# Patient Record
Sex: MALE | Race: WHITE | URBAN - METROPOLITAN AREA
[De-identification: names, ages, dates, MRNs, and addresses within clinical notes are randomized per-mention and may not be internally consistent; named-entity substitution may affect disease eponyms.]

---

## 2017-10-06 ENCOUNTER — FOLLOW-UP (OUTPATIENT)
Dept: URBAN - METROPOLITAN AREA CLINIC 14 | Facility: CLINIC | Age: 32
Setting detail: DERMATOLOGY
End: 2017-10-06

## 2017-10-06 DIAGNOSIS — C44.311 BASAL CELL CARCINOMA OF SKIN OF NOSE: ICD-10-CM

## 2017-10-06 PROCEDURE — 99214 OFFICE O/P EST MOD 30 MIN: CPT

## 2017-11-06 ENCOUNTER — RX ONLY (RX ONLY)
Age: 32
End: 2017-11-06

## 2017-11-06 RX ORDER — GUSELKUMAB 100 MG/ML
1 ML INJECTION SUBCUTANEOUS ADD'L SIG
Qty: 1 | Refills: 5 | Status: DISCONTINUED
Start: 2017-11-06 | End: 2018-03-27

## 2018-03-27 ENCOUNTER — RX ONLY (RX ONLY)
Age: 33
End: 2018-03-27

## 2018-03-27 RX ORDER — GUSELKUMAB 100 MG/ML
1 ML INJECTION SUBCUTANEOUS ADD'L SIG
Qty: 1 | Refills: 5 | Status: DISCONTINUED
Start: 2018-03-27 | End: 2018-11-19

## 2018-11-19 ENCOUNTER — FOLLOW-UP (OUTPATIENT)
Dept: URBAN - METROPOLITAN AREA CLINIC 14 | Facility: CLINIC | Age: 33
Setting detail: DERMATOLOGY
End: 2018-11-19

## 2018-11-19 DIAGNOSIS — L73.8 OTHER SPECIFIED FOLLICULAR DISORDERS: ICD-10-CM

## 2018-11-19 PROCEDURE — 99212 OFFICE O/P EST SF 10 MIN: CPT

## 2018-11-19 RX ORDER — GUSELKUMAB 100 MG/ML
1 ML INJECTION SUBCUTANEOUS ADD'L SIG
Qty: 1 | Refills: 5 | Status: DISCONTINUED
Start: 2018-11-19 | End: 2018-11-20

## 2018-11-20 ENCOUNTER — RX ONLY (RX ONLY)
Age: 33
End: 2018-11-20

## 2018-11-20 RX ORDER — GUSELKUMAB 100 MG/ML
1 ML INJECTION SUBCUTANEOUS ADD'L SIG
Qty: 1 | Refills: 5 | Status: DISCONTINUED
Start: 2018-11-20 | End: 2019-11-06

## 2019-11-06 ENCOUNTER — RX ONLY (RX ONLY)
Age: 34
End: 2019-11-06

## 2019-11-06 RX ORDER — GUSELKUMAB 100 MG/ML
1 ML INJECTION SUBCUTANEOUS ADD'L SIG
Qty: 1 | Refills: 5
Start: 2019-11-06

## 2019-11-11 ENCOUNTER — FOLLOW-UP (OUTPATIENT)
Dept: URBAN - METROPOLITAN AREA CLINIC 14 | Facility: CLINIC | Age: 34
Setting detail: DERMATOLOGY
End: 2019-11-11

## 2019-11-11 PROCEDURE — 99213 OFFICE O/P EST LOW 20 MIN: CPT

## 2019-11-11 RX ORDER — GUSELKUMAB 100 MG/ML
1 ADD'L SIG INJECTION SUBCUTANEOUS AS DIRECTED
Qty: 1 | Refills: 6
Start: 2019-11-11

## 2020-03-26 ENCOUNTER — RX ONLY (RX ONLY)
Age: 35
End: 2020-03-26

## 2020-03-26 RX ORDER — EMOLLIENT COMBINATION NO.43
1 APPLICATION CREAM (GRAM) TOPICAL DAILY
Qty: 30 | Refills: 5
Start: 2020-03-26

## 2020-03-26 RX ORDER — EMOLLIENT COMBINATION NO.43
1 APPLICATION CREAM (GRAM) TOPICAL DAILY
Qty: 30 | Refills: 5 | Status: DISCONTINUED
Start: 2020-03-26 | End: 2020-03-26

## 2020-12-21 ENCOUNTER — TELEHEALTH (OUTPATIENT)
Dept: URBAN - METROPOLITAN AREA CLINIC 14 | Facility: CLINIC | Age: 35
Setting detail: DERMATOLOGY
End: 2020-12-21

## 2020-12-21 DIAGNOSIS — D48.5 NEOPLASM OF UNCERTAIN BEHAVIOR OF SKIN: ICD-10-CM

## 2020-12-21 PROCEDURE — 99212 OFFICE O/P EST SF 10 MIN: CPT

## 2020-12-21 RX ORDER — GUSELKUMAB 100 MG/ML
1 PEN INJECTOR INJECTION SUBCUTANEOUS
Qty: 1 | Refills: 6
Start: 2020-12-21

## 2020-12-28 ENCOUNTER — RX ONLY (RX ONLY)
Age: 35
End: 2020-12-28

## 2020-12-28 RX ORDER — GUSELKUMAB 100 MG/ML
1 PEN INJECTOR INJECTION SUBCUTANEOUS
Qty: 1 | Refills: 6
Start: 2020-12-28

## 2021-03-26 ENCOUNTER — RX ONLY (RX ONLY)
Age: 36
End: 2021-03-26

## 2021-03-26 RX ORDER — SILVER SULFADIAZINE 10 MG/G
1 A SMALL AMOUNT CREAM TOPICAL TWICE A DAY
Qty: 50 | Refills: 1
Start: 2021-03-26

## 2021-09-22 ENCOUNTER — RX ONLY (RX ONLY)
Age: 36
End: 2021-09-22

## 2021-09-22 RX ORDER — EMOLLIENT COMBINATION NO.43
A SMALL AMOUNT CREAM (GRAM) TOPICAL ONCE A DAY
Qty: 30 | Refills: 2
Start: 2021-09-22

## 2022-02-18 ENCOUNTER — FOLLOW-UP (OUTPATIENT)
Dept: URBAN - METROPOLITAN AREA CLINIC 14 | Facility: CLINIC | Age: 37
Setting detail: DERMATOLOGY
End: 2022-02-18

## 2022-02-18 DIAGNOSIS — D03.72 MELANOMA IN SITU OF LEFT LOWER LIMB, INCLUDING HIP: ICD-10-CM

## 2022-02-18 PROCEDURE — 99213 OFFICE O/P EST LOW 20 MIN: CPT

## 2022-02-18 RX ORDER — GUSELKUMAB 100 MG/ML
1 PEN INJECTOR INJECTION SUBCUTANEOUS
Qty: 1 | Refills: 6
Start: 2022-02-18

## 2022-02-21 ENCOUNTER — RX ONLY (RX ONLY)
Age: 37
End: 2022-02-21

## 2022-02-21 RX ORDER — GUSELKUMAB 100 MG/ML
1 PEN INJECTOR INJECTION SUBCUTANEOUS
Qty: 1 | Refills: 6
Start: 2022-02-21

## 2022-03-14 ENCOUNTER — RX ONLY (RX ONLY)
Age: 37
End: 2022-03-14

## 2022-03-14 RX ORDER — EMOLLIENT COMBINATION NO.43
A SMALL AMOUNT CREAM (GRAM) TOPICAL ONCE A DAY
Qty: 30 | Refills: 2
Start: 2022-03-14

## 2022-08-17 ENCOUNTER — RX ONLY (RX ONLY)
Age: 37
End: 2022-08-17

## 2022-08-17 RX ORDER — EMOLLIENT COMBINATION NO.43
A SMALL AMOUNT CREAM (GRAM) TOPICAL ONCE A DAY
Qty: 30 | Refills: 3
Start: 2022-08-17

## 2022-09-20 ENCOUNTER — RX ONLY (RX ONLY)
Age: 37
End: 2022-09-20

## 2022-09-20 RX ORDER — FLUOCINOLONE ACETONIDE 0.11 MG/ML
OIL AURICULAR (OTIC)
Qty: 20 | Refills: 1 | Status: ERX | COMMUNITY
Start: 2022-09-20

## 2023-04-04 ENCOUNTER — RX ONLY (RX ONLY)
Age: 38
End: 2023-04-04

## 2023-04-04 RX ORDER — GUSELKUMAB 100 MG/ML
100MG INJECTION SUBCUTANEOUS
Qty: 1 | Refills: 0 | Status: ERX | COMMUNITY
Start: 2023-04-04

## 2023-04-21 ENCOUNTER — APPOINTMENT (OUTPATIENT)
Dept: URBAN - METROPOLITAN AREA CLINIC 210 | Age: 38
Setting detail: DERMATOLOGY
End: 2023-04-22

## 2023-04-21 DIAGNOSIS — L81.4 OTHER MELANIN HYPERPIGMENTATION: ICD-10-CM

## 2023-04-21 DIAGNOSIS — L40.0 PSORIASIS VULGARIS: ICD-10-CM

## 2023-04-21 PROCEDURE — OTHER COUNSELING: OTHER

## 2023-04-21 PROCEDURE — 99213 OFFICE O/P EST LOW 20 MIN: CPT

## 2023-04-21 PROCEDURE — OTHER TREMFYA MONITORING: OTHER

## 2023-04-21 PROCEDURE — OTHER MIPS QUALITY: OTHER

## 2023-04-21 ASSESSMENT — BSA PSORIASIS: % BODY COVERED IN PSORIASIS: 1

## 2023-04-21 ASSESSMENT — ITCH NUMERIC RATING SCALE: HOW SEVERE IS YOUR ITCHING?: 0

## 2023-04-21 ASSESSMENT — LOCATION SIMPLE DESCRIPTION DERM
LOCATION SIMPLE: RIGHT FOREARM
LOCATION SIMPLE: LEFT FOREARM
LOCATION SIMPLE: RIGHT UPPER BACK

## 2023-04-21 ASSESSMENT — LOCATION DETAILED DESCRIPTION DERM
LOCATION DETAILED: LEFT PROXIMAL DORSAL FOREARM
LOCATION DETAILED: RIGHT PROXIMAL DORSAL FOREARM
LOCATION DETAILED: RIGHT MEDIAL UPPER BACK

## 2023-04-21 ASSESSMENT — LOCATION ZONE DERM
LOCATION ZONE: ARM
LOCATION ZONE: TRUNK

## 2023-04-21 ASSESSMENT — PGA PSORIASIS: PGA PSORIASIS 2020: CLEAR

## 2023-04-21 NOTE — PROCEDURE: TREMFYA MONITORING
Acid reflux    Essential (Primary) Hypertensi    MI (Myocardial Infarction)    Vasculitis
Patient Reported Weight(Optional But Include Units): 175
Detail Level: Zone
Length Of Therapy: 3+ years
Comments: Patient is doing well on therapy, remaining clear with Tremfya, patient has no active psoriasis on body, no scale or itch present.
Add High Risk Medication Management Associated Diagnosis?: No

## 2023-04-25 ENCOUNTER — RX ONLY (RX ONLY)
Age: 38
End: 2023-04-25

## 2023-04-25 RX ORDER — GUSELKUMAB 100 MG/ML
100MG INJECTION SUBCUTANEOUS
Qty: 1 | Refills: 6 | Status: ERX

## 2024-02-05 ENCOUNTER — RX ONLY (RX ONLY)
Age: 39
End: 2024-02-05

## 2024-02-05 RX ORDER — EMOLLIENT COMBINATION NO.43
CREAM (GRAM) TOPICAL
Qty: 30 | Refills: 3 | Status: ERX | COMMUNITY
Start: 2024-02-05

## 2024-03-18 ENCOUNTER — APPOINTMENT (OUTPATIENT)
Dept: URBAN - METROPOLITAN AREA CLINIC 210 | Age: 39
Setting detail: DERMATOLOGY
End: 2024-03-24

## 2024-03-18 DIAGNOSIS — L40.0 PSORIASIS VULGARIS: ICD-10-CM

## 2024-03-18 PROCEDURE — OTHER TREMFYA MONITORING: OTHER

## 2024-03-18 PROCEDURE — OTHER MIPS QUALITY: OTHER

## 2024-03-18 PROCEDURE — OTHER COUNSELING: OTHER

## 2024-03-18 PROCEDURE — 99213 OFFICE O/P EST LOW 20 MIN: CPT

## 2024-03-18 PROCEDURE — OTHER ADDITIONAL NOTES: OTHER

## 2024-03-18 ASSESSMENT — LOCATION ZONE DERM
LOCATION ZONE: NECK
LOCATION ZONE: TRUNK

## 2024-03-18 ASSESSMENT — LOCATION SIMPLE DESCRIPTION DERM
LOCATION SIMPLE: LEFT ANTERIOR NECK
LOCATION SIMPLE: RIGHT UPPER BACK

## 2024-03-18 ASSESSMENT — BSA PSORIASIS: % BODY COVERED IN PSORIASIS: 0

## 2024-03-18 ASSESSMENT — ITCH NUMERIC RATING SCALE: HOW SEVERE IS YOUR ITCHING?: 0

## 2024-03-18 ASSESSMENT — LOCATION DETAILED DESCRIPTION DERM
LOCATION DETAILED: RIGHT MEDIAL UPPER BACK
LOCATION DETAILED: LEFT SUPERIOR ANTERIOR NECK

## 2024-03-18 NOTE — PROCEDURE: ADDITIONAL NOTES
Render Risk Assessment In Note?: no
Detail Level: Simple
Additional Notes: Tried and failed topical steroids, steroid injections and Elidel\\nCurrent treatment with Tremfya since 11/2017

## 2024-03-18 NOTE — PROCEDURE: TREMFYA MONITORING
Patient Reported Weight(Optional But Include Units): 164
Detail Level: Zone
Length Of Therapy: 3+ years
Comments: Patient is doing well on therapy, remaining clear with Tremfya, patient has no active psoriasis on body, no scale or itch present, no need to use topicals.
Add High Risk Medication Management Associated Diagnosis?: No

## 2024-03-19 ENCOUNTER — RX ONLY (RX ONLY)
Age: 39
End: 2024-03-19

## 2024-03-19 RX ORDER — GUSELKUMAB 100 MG/ML
100MG INJECTION SUBCUTANEOUS
Qty: 1 | Refills: 6 | Status: ERX

## 2024-07-02 ENCOUNTER — RX ONLY (RX ONLY)
Age: 39
End: 2024-07-02

## 2024-07-02 RX ORDER — PIMECROLIMUS 10 MG/G
CREAM TOPICAL
Qty: 60 | Refills: 1 | Status: ERX | COMMUNITY
Start: 2024-07-02

## 2024-07-02 RX ORDER — TRIAMCINOLONE ACETONIDE 1 MG/G
CREAM TOPICAL
Qty: 453.6 | Refills: 1 | Status: ERX | COMMUNITY
Start: 2024-07-02

## 2025-04-02 ENCOUNTER — RX ONLY (RX ONLY)
Age: 40
End: 2025-04-02

## 2025-04-02 RX ORDER — GUSELKUMAB 100 MG/ML
100MG INJECTION SUBCUTANEOUS
Qty: 1 | Refills: 1 | Status: ERX

## 2025-04-11 ENCOUNTER — APPOINTMENT (OUTPATIENT)
Dept: URBAN - METROPOLITAN AREA CLINIC 210 | Age: 40
Setting detail: DERMATOLOGY
End: 2025-04-20

## 2025-04-11 DIAGNOSIS — L40.0 PSORIASIS VULGARIS: ICD-10-CM

## 2025-04-11 PROCEDURE — OTHER TREMFYA MONITORING: OTHER

## 2025-04-11 PROCEDURE — OTHER NO CHARGE VISIT: OTHER

## 2025-04-11 PROCEDURE — OTHER MIPS QUALITY: OTHER

## 2025-04-11 PROCEDURE — OTHER ADDITIONAL NOTES: OTHER

## 2025-04-11 PROCEDURE — OTHER COUNSELING: OTHER

## 2025-04-11 ASSESSMENT — LOCATION ZONE DERM
LOCATION ZONE: LEG
LOCATION ZONE: ARM
LOCATION ZONE: TRUNK
LOCATION ZONE: SCALP
LOCATION ZONE: NECK

## 2025-04-11 ASSESSMENT — LOCATION SIMPLE DESCRIPTION DERM
LOCATION SIMPLE: LEFT BUTTOCK
LOCATION SIMPLE: POSTERIOR SCALP
LOCATION SIMPLE: LEFT KNEE
LOCATION SIMPLE: RIGHT ELBOW
LOCATION SIMPLE: LEFT ELBOW
LOCATION SIMPLE: LEFT ANTERIOR NECK
LOCATION SIMPLE: RIGHT UPPER BACK
LOCATION SIMPLE: ANTERIOR SCALP
LOCATION SIMPLE: RIGHT BUTTOCK
LOCATION SIMPLE: LEFT UPPER BACK
LOCATION SIMPLE: LEFT THIGH
LOCATION SIMPLE: RIGHT THIGH
LOCATION SIMPLE: RIGHT KNEE

## 2025-04-11 ASSESSMENT — LOCATION DETAILED DESCRIPTION DERM
LOCATION DETAILED: LEFT SUPERIOR ANTERIOR NECK
LOCATION DETAILED: MID-OCCIPITAL SCALP
LOCATION DETAILED: LEFT ANTERIOR PROXIMAL THIGH
LOCATION DETAILED: LEFT ELBOW
LOCATION DETAILED: RIGHT KNEE
LOCATION DETAILED: MID-FRONTAL SCALP
LOCATION DETAILED: LEFT KNEE
LOCATION DETAILED: LEFT MEDIAL UPPER BACK
LOCATION DETAILED: RIGHT BUTTOCK
LOCATION DETAILED: LEFT BUTTOCK
LOCATION DETAILED: RIGHT MEDIAL UPPER BACK
LOCATION DETAILED: RIGHT ANTERIOR PROXIMAL THIGH
LOCATION DETAILED: RIGHT ELBOW

## 2025-04-11 ASSESSMENT — BSA PSORIASIS: % BODY COVERED IN PSORIASIS: 0

## 2025-04-11 ASSESSMENT — PGA PSORIASIS: PGA PSORIASIS 2020: CLEAR

## 2025-04-11 ASSESSMENT — ITCH NUMERIC RATING SCALE: HOW SEVERE IS YOUR ITCHING?: 0

## 2025-04-11 NOTE — HPI: PSORIASIS (PATIENT REPORTED)
Do You Have A Family History Of Psoriasis?: yes
Have You Been Diagnosed With Psoriatic Arthritis?: no
Where Is Your Psoriasis Located?: Clear today with Tremfya  treatment
Please List The Treatments That Have Worked Best For Your Psoriasis: (Separate Each Entry With A Comma): Tremfya

## 2025-04-11 NOTE — PROCEDURE: ADDITIONAL NOTES
Additional Notes: Tried and failed topical steroids, steroid injections and Elidel\\nCurrent treatment with Tremfya since 11/2017. Patient is doing well on therapy, remaining clear with Tremfya, patient has no active psoriasis on body, no scale or itch present, no need to use topicals.

## 2025-04-15 ENCOUNTER — RX ONLY (RX ONLY)
Age: 40
End: 2025-04-15

## 2025-04-15 RX ORDER — GUSELKUMAB 100 MG/ML
100MG INJECTION SUBCUTANEOUS
Qty: 1 | Refills: 6 | Status: ERX